# Patient Record
(demographics unavailable — no encounter records)

---

## 2024-11-08 NOTE — HEALTH RISK ASSESSMENT
[Little interest or pleasure doing things] : 1) Little interest or pleasure doing things [Feeling down, depressed, or hopeless] : 2) Feeling down, depressed, or hopeless [0] : 2) Feeling down, depressed, or hopeless: Not at all (0) [PHQ-9 Negative - No further assessment needed] : PHQ-9 Negative - No further assessment needed [Former] : Former [[id number]] : 0

## 2024-11-08 NOTE — HISTORY OF PRESENT ILLNESS
[FreeTextEntry1] : pt here for follow up. [de-identified] : 69 y/o M w/ PMHx of CKD III (Dr. French, Nephro), Afib/flutter on Eliquis s/p DCCV and ablation, myocardial bridge, sinus pauses (refusing PPM), hypothyroidism, osteoporosis (Prolia), GERD (on tums), glaucoma, urge incontinence, chronic bronhcitis and h/o pneumonia a few times (mycoplasma and legionnaires years ago).  11/7 had chills, fever sept 2024, throat pain, couldnt stop coughing, productive cough, tried vicks vaporub, robutussin, vitamin C, took inhaler, gradually coming around, currently afebrile, still has intermittent cough, minimally productive sputum especially at night, side-sleeper, a/w fatigue. Denies dysphagia, loss of appetite. denies wheezing or SOB. denies altered taste in the mouth.   Social history- former smoker, smoked 1 pipe per day for 20 years, stopped in 1993.

## 2024-11-08 NOTE — PHYSICAL EXAM
[Well Nourished] : well nourished [PERRL] : pupils equal round and reactive to light [Normal Oropharynx] : the oropharynx was normal [No Lymphadenopathy] : no lymphadenopathy [Clear to Auscultation] : lungs were clear to auscultation bilaterally [Normal S1, S2] : normal S1 and S2 [No Edema] : there was no peripheral edema [Soft] : abdomen soft [Non Tender] : non-tender [Non-distended] : non-distended [No HSM] : no HSM [No Focal Deficits] : no focal deficits [Alert and Oriented x3] : oriented to person, place, and time [No Accessory Muscle Use] : no accessory muscle use [de-identified] : no sinus tenserness, no enlarged lymph nodes [de-identified] : no egophony, no bronchophony, resonant to percussion

## 2024-11-08 NOTE — REVIEW OF SYSTEMS
[Cough] : cough [Fever] : no fever [Night Sweats] : no night sweats [Vision Problems] : no vision problems [Nasal Discharge] : no nasal discharge [Sore Throat] : no sore throat [Chest Pain] : no chest pain [Shortness Of Breath] : no shortness of breath [Wheezing] : no wheezing [Abdominal Pain] : no abdominal pain [Nausea] : no nausea [Constipation] : no constipation [Diarrhea] : no diarrhea [Vomiting] : no vomiting [Dysuria] : no dysuria [Hematuria] : no hematuria [Joint Pain] : no joint pain [Joint Stiffness] : no joint stiffness [Back Pain] : no back pain [Joint Swelling] : no joint swelling [Itching] : no itching [Headache] : no headache [Dizziness] : no dizziness

## 2024-11-08 NOTE — END OF VISIT
[] : Resident [FreeTextEntry3] : 69 yo male with hx chronic bronchitis, CKD, afib/flutter here for cough x 2 months.    1) cough - pulm cause vs. ?GERD vs. post infectious cough syndrome albuterol inhaler given duration and hx pna, will get CXR, cbc if no improvement and work up unremarkable, recommend see pulm

## 2024-11-08 NOTE — HEALTH RISK ASSESSMENT
[Little interest or pleasure doing things] : 1) Little interest or pleasure doing things [Feeling down, depressed, or hopeless] : 2) Feeling down, depressed, or hopeless [0] : 2) Feeling down, depressed, or hopeless: Not at all (0) [PHQ-9 Negative - No further assessment needed] : PHQ-9 Negative - No further assessment needed [Former] : Former [[id number]] : 0

## 2024-11-08 NOTE — PHYSICAL EXAM
[Well Nourished] : well nourished [PERRL] : pupils equal round and reactive to light [Normal Oropharynx] : the oropharynx was normal [No Lymphadenopathy] : no lymphadenopathy [Clear to Auscultation] : lungs were clear to auscultation bilaterally [Normal S1, S2] : normal S1 and S2 [No Edema] : there was no peripheral edema [Soft] : abdomen soft [Non Tender] : non-tender [Non-distended] : non-distended [No HSM] : no HSM [No Focal Deficits] : no focal deficits [Alert and Oriented x3] : oriented to person, place, and time [No Accessory Muscle Use] : no accessory muscle use [de-identified] : no sinus tenserness, no enlarged lymph nodes [de-identified] : no egophony, no bronchophony, resonant to percussion

## 2024-11-08 NOTE — HISTORY OF PRESENT ILLNESS
[FreeTextEntry1] : pt here for follow up. [de-identified] : 71 y/o M w/ PMHx of CKD III (Dr. French, Nephro), Afib/flutter on Eliquis s/p DCCV and ablation, myocardial bridge, sinus pauses (refusing PPM), hypothyroidism, osteoporosis (Prolia), GERD (on tums), glaucoma, urge incontinence, chronic bronhcitis and h/o pneumonia a few times (mycoplasma and legionnaires years ago).  11/7 had chills, fever sept 2024, throat pain, couldnt stop coughing, productive cough, tried vicks vaporub, robutussin, vitamin C, took inhaler, gradually coming around, currently afebrile, still has intermittent cough, minimally productive sputum especially at night, side-sleeper, a/w fatigue. Denies dysphagia, loss of appetite. denies wheezing or SOB. denies altered taste in the mouth.   Social history- former smoker, smoked 1 pipe per day for 20 years, stopped in 1993.

## 2024-12-06 NOTE — HISTORY OF PRESENT ILLNESS
[FreeTextEntry1] : Pt is here for a follow up. [de-identified] : 69 y/o M w/ PMHx of CKD III (Dr. French, Nephro), Afib/flutter on Eliquis s/p DCCV and ablation, myocardial bridge, sinus pauses (refusing PPM), hypothyroidism, osteoporosis (Prolia), GERD (on tums), glaucoma, urge incontinence, chronic bronhcitis and h/o pneumonia a few times (mycoplasma and legionnaires years ago) presenting w/ 2-3w history of L elbow bursitis. Patient noticed swelling, warmth and mild tenderness of his L elbow which he treated w/ 'cold spray' which initially helped with his symptoms however symptoms returned. Believes that his symptoms are due to continued leaning on his elbow. Denies any trauma, and ROM limitations. Of note patient had history of R elbow bursitis which resolved after aspiration.

## 2024-12-06 NOTE — PLAN
[FreeTextEntry1] : #Olecranon bursitis L elbow. Refractory to icing. - conservative therapy - icing and compression - hand ortho for aspiration  Plan discussed w/ Dr Alicea

## 2024-12-06 NOTE — HEALTH RISK ASSESSMENT
[0] : 2) Feeling down, depressed, or hopeless: Not at all (0) [PHQ-2 Negative - No further assessment needed] : PHQ-2 Negative - No further assessment needed [PHQ-9 Negative - No further assessment needed] : PHQ-9 Negative - No further assessment needed [Never] : Never

## 2024-12-06 NOTE — PHYSICAL EXAM
[No Acute Distress] : no acute distress [Normal Sclera/Conjunctiva] : normal sclera/conjunctiva [PERRL] : pupils equal round and reactive to light [EOMI] : extraocular movements intact [Normal Outer Ear/Nose] : the outer ears and nose were normal in appearance [No JVD] : no jugular venous distention [No Lymphadenopathy] : no lymphadenopathy [Supple] : supple [Thyroid Normal, No Nodules] : the thyroid was normal and there were no nodules present [No Respiratory Distress] : no respiratory distress  [No Accessory Muscle Use] : no accessory muscle use [Clear to Auscultation] : lungs were clear to auscultation bilaterally [Normal Rate] : normal rate  [Regular Rhythm] : with a regular rhythm [Normal S1, S2] : normal S1 and S2 [No Murmur] : no murmur heard [No Edema] : there was no peripheral edema [Soft] : abdomen soft [Non Tender] : non-tender [Non-distended] : non-distended [No Masses] : no abdominal mass palpated [No HSM] : no HSM [Normal Bowel Sounds] : normal bowel sounds [No CVA Tenderness] : no CVA  tenderness [No Spinal Tenderness] : no spinal tenderness [No Joint Swelling] : no joint swelling [No Rash] : no rash [No Focal Deficits] : no focal deficits

## 2024-12-06 NOTE — REVIEW OF SYSTEMS
[Fever] : no fever [Chills] : no chills [Vision Problems] : no vision problems [Chest Pain] : no chest pain [Palpitations] : no palpitations [Claudication] : no  leg claudication [Lower Ext Edema] : no lower extremity edema [Shortness Of Breath] : no shortness of breath [Wheezing] : no wheezing [Cough] : no cough [Dyspnea on Exertion] : not dyspnea on exertion [Constipation] : no constipation [Diarrhea] : no diarrhea [Dysuria] : no dysuria [Joint Pain] : no joint pain [Joint Stiffness] : no joint stiffness [Muscle Pain] : no muscle pain [Muscle Weakness] : no muscle weakness [Joint Swelling] : joint swelling [Skin Rash] : no skin rash [Headache] : no headache [Dizziness] : no dizziness

## 2024-12-06 NOTE — END OF VISIT
[] : Resident [FreeTextEntry3] : 69 yo male with multiple medical problems here for L elbow lump  1) L elbow swelling/lump - likely 2/2 olecranon bursitis refer to ortho, advised compression

## 2025-01-17 NOTE — RESULTS/DATA
[TextEntry] : RADIOLOGY  1/13/25 Findings/ impression: Hyperinflation. Cardiomegaly, thoracic aortic tortuosity. Lungs and mediastinum are unremarkable. Stable bony structures, severe scoliosis. Right rib old fractures.   PFT    EKG / ECHO     MICRO      BACTERIAL:       MYCOBACTERIAL:           FUNGAL:     PATH     OTHER LABS OF NOTE

## 2025-01-22 NOTE — HISTORY OF PRESENT ILLNESS
[TextBox_4] : 69 y/o M w/ PMHx of CKD III (Dr. French, Nephro), Afib/flutter on Eliquis s/p DCCV and ablation, myocardial bridge, sinus pauses (refusing PPM), hypothyroidism, osteoporosis (Prolia), GERD (on tums), glaucoma, urge incontinence, chronic bronhcitis and h/o pneumonia a few times (mycoplasma and legionnaires years ago) [Former] : former

## 2025-01-22 NOTE — HISTORY OF PRESENT ILLNESS
[TextBox_4] : 71 y/o M w/ PMHx of CKD III (Dr. French, Nephro), Afib/flutter on Eliquis s/p DCCV and ablation, myocardial bridge, sinus pauses (refusing PPM), hypothyroidism, osteoporosis (Prolia), GERD (on tums), glaucoma, urge incontinence, chronic bronhcitis and h/o pneumonia a few times (mycoplasma and legionnaires years ago) [Former] : former

## 2025-03-19 NOTE — DATA REVIEWED
[FreeTextEntry1] : Most recent bone mineral density Date: July 1, 2024 Source: Hologic Site: [facility]  Site	BMD	T-score	Change previous	Change baseline	 Lumbar spine	1.024	-0.2		+4.3%#	 Femoral neck	0.488	-3.2		+8.7%#	 Total hip           0.655	-2.4		+9.2%#	 Distal radius	0.613	-1.3		+3.8%	 DXA comments: #Dissimilar scan analysis; left hip values Vertebral fracture assessment without evidence of compression fractures from L1 to L5 (my read)  Impression: I have personally reviewed the DXA images and report, and I compared these images to a DXA dated April 27, 2022 from [facility]. There is osteoporosis by the World Health Organization criteria. While not directly comparable, there were apparent improvements at the lumbar spine, femoral neck, and total hip from previous. Vertebral fracture assessment without evidence of compression fractures.

## 2025-03-19 NOTE — HISTORY OF PRESENT ILLNESS
[FreeTextEntry1] : Mr. Berry is a 71 year-old man with a history of hypertension, hypothyroidism, urinary incontinence, benign prostatic hypertrophy presenting for follow-up of osteoporosis and hypothyroidism. I saw him for an initial visit in January 2018 and last in September 2024.  Bone History Osteoporosis diagnosed in 2012 on bone density (report not available); most recent bone density as below Fracture history: Potential left rib fracture when someone was walking on his back in approximately 2008; vertebral fracture assessment in 2014 showed multiple wedge deformities in the thoracic region (no new fractures on imaging in July 2022) Family history: No parental history of hip fracture Treatment:  Alendronate from 2012 to February 2018, taking correctly and compliant  Teriparatide 20 mcg daily from February 2018 to March 2020 (held from April to June 2018 during evaluation for palpitations)  Denosumab 60 mg SC in April 2020, missed doses during the pandemic; July 2021, January 2022, July 2022, February 2023, August 2023, March 2024, September 2024  Falls: None recent Height loss: About 2 inches Kidney stones: No Dental health: Regular appointments, no upcoming procedures planned Exercise: Walks at least 20 minutes most days, climbs stairs Dairy intake: 1 serving daily (cheese on toast daily) Calcium supplements: 600 mg daily Multivitamin: None Vitamin D supplements: in calcium supplement  Osteoporosis risk factors include: Postmenopausal status,  race, prior fracture, falls, height loss, small thin bones, tobacco use, excessive alcohol, anorexia, family history, vitamin D deficiency, corticosteroid use, seizure medications, malabsorption, hyperparathyroidism, hyperthyroidism. NEGATIVE EXCEPT:  race, prior fracture  Hypothyroidism. Thyroid nodules. He was diagnosed with hypothyroidism and thyroid nodules in 2019.  He has been taking levothyroxine 75 mcg 6 days/week. Thyroid ultrasound in July 2022 with bilateral subcentimeter nodules not meeting criteria for biopsy. He has been clinically and biochemically euthyroid on his regimen of levothyroxine.  No family history of thyroid disease or thyroid cancer.  Interim History  He has received denosumab from July 2021 to present. He has recovered from a chronic nonproductive cough; he was seen by pulmonology.  He has seen multiple providers; notes reviewed.  He has had pan related to osteoarthritis. He has had recurrent trigger finger, with pain improved with a brace. He otherwise feels well.  Medical and surgical history, medications, allergies, social and family history reviewed and updated as needed.

## 2025-03-19 NOTE — PHYSICAL EXAM
[Alert] : alert [Healthy Appearance] : healthy appearance [No Acute Distress] : no acute distress [Normal Sclera/Conjunctiva] : normal sclera/conjunctiva [Normal Hearing] : hearing was normal [No Respiratory Distress] : no respiratory distress [Kyphosis] : kyphosis present [No Stigmata of Cushings Syndrome] : no stigmata of Cushings Syndrome [Acanthosis Nigricans] : no acanthosis nigricans [Normal Insight/Judgement] : insight and judgment were intact [de-identified] : no moon facies, no supraclavicular fat pads [de-identified] : narrow-based gait with cane

## 2025-03-19 NOTE — ASSESSMENT
[FreeTextEntry1] : Osteoporosis. He has a history of multiple morphometric thoracic vertebral fractures. Metabolic evaluation for secondary causes of bone loss previously unremarkable. He had been treated with alendronate since 2012 without significant improvement in bone density. He was treated teriparatide from February 2018 to March 2020 (held from April to June 2018 during evaluation for palpitations), followed by denosumab in April 2020; he missed subsequent therapy during the pandemic and has received denosumab from July 2021 to present. He is tolerating denosumab and we will continue. We discussed that denosumab must be dosed every 6 months due to rebound increase in bone breakdown with abrupt discontinuation of therapy, with transition to bisphosphonate therapy prior to a "drug holiday." Continue denosumab 60 mg SC every 6 months; dose administered today Calcium 2066-5245 mg daily from diet and supplements (to be taken in divided doses as no more than 500-600 mg can be absorbed at one time); continue current regimen Continue current vitamin D regimen pending level with next blood tests Diet, exercise and fall prevention discussed  Hypothyroidism. Thyroid nodules. He was diagnosed with hypothyroidism and thyroid nodules in 2019. He has been biochemically euthyroid on his current regimen of levothyroxine. Thyroid ultrasound in July 2022 with bilateral subcentimeter nodules not meeting criteria for biopsy nor monitoring.  Continue levothyroxine 75 mcg 6 days/week pending level with next blood tests  I reviewed the DXA performed on July 1, 2024 with the patient today.  I reviewed the laboratories performed from July 15, 2024 to present with the patient today. I counseled the patient regarding calcium and vitamin D intake today. I discussed the following osteoporosis therapies: Denosumab

## 2025-03-26 NOTE — DISCUSSION/SUMMARY
[Patient] : the patient [EKG obtained to assist in diagnosis and management of assessed problem(s)] : EKG obtained to assist in diagnosis and management of assessed problem(s) [___ Month(s)] : in [unfilled] month(s) [FreeTextEntry1] : 72 y/o male with h/o htn, hl, hypothyroid, migraine, hcm, afib/flutter s/p failed dccv/rfa, predm, ckd, myocardial bridge, who presents for f/up today   -dc imdur  -CV MRI 8/23 1. The left ventricle (LV) is normal in size. There is asymmetric septal hypertrophy with a maximum measurement of 15 mm. Left ventricular global systolic function is normal. The LV ejection fraction is 54 %. 2. Markedly dilated left atrium. 3. The right ventricle (RV) is normal in size. RV global systolic function is normal. The RV ejection fraction is 56 %. 4. Mild to moderate mitral regurgitation. 5. No significant abnormalities of the visualized portions of the great vessels. 6. On delayed enhancement imaging, there is focal spots of enhancement noted in the basal and mid inferoseptum, which quantifies to approximately 4% of the total myocardium. 7. Differential diagnosis to consider is hypertrophic cardiomyopathy.  -HCM - f/up with EP eval - noted Permanent AF at this time with slow ventricular response and evidence of long pauses. EP highly recommend a pacemaker implant for injury prevention. - in particular leadless micra ppm in detail. - patient not interested at this point EP would not recommend ICD at this point   -close monitoring blood sugar for predm  -labs 2024 reviewed, LpA ordered   -continue lisinopril   -continue eliquis  -f/up renal for ckd  -Echo 2020 CONCLUSIONS:  1. Normal left and right ventricular size and systolic function.  2. Left ventricular ejection fraction is 60-65%.  3. Mild symmetric left ventricular hypertrophy.  4. Aortic sclerosis without significant stenosis.  5. Trace aortic regurgitation.  6. No other significant valvular disease.   7. Biatrial enlargement.  -ambulatory telemetry 12/11 - 12/17/20 significant for Afib/flutter with HR range 29-, prolonged pauses up to 5.9 seconds  -CTA 11/20: Ca score 0, mLAD myocardial bridge  Impression:  1. The calcium score is zero.  2. Myocardial bridge in the mid LAD, 40mm in length and 3.5mm deep, with mild luminal narrowing.  3. Coronary arteries otherwise appear normal.  5. Myocardial crypts in the LV mid septal wall. Prominent trabeculations noted in the left ventricle.  -continue statin  -avoid jaz agents for bridge given pauses on monitor  -ekg ordered today - afib w pvc, rbbb, no st/t changes  -counseled on cvd risk factors  -f/up 6 months for cvd risk factors  I have spent 40 minutes reviewing labs, records, tests and discussed cvd risk factors,

## 2025-03-26 NOTE — HISTORY OF PRESENT ILLNESS
[FreeTextEntry1] : 72 y/o male with h/o htn, hl, hypothyroid, migraine, hcm, ckd, afib/flutter s/p failed dccv/rfa, predm, myocardial bridge, who presents for f/up today    last seen  via telephone   no cp, sob, syncope, lh, palpitations, edema, orthopnea, pnd no further cough/sob  has been on imdur for myocardial bridge  seen by pulm for buchanan  - [place] and PFT normal - CXR done on 25 at [facility] was normal   seen by EP for -HCM - f/up with EP eval - noted Permanent AF at this time with slow ventricular response and evidence of long pauses. EP highly recommend a pacemaker implant for injury prevention. - in particular leadless micra ppm in detail. - patient not interested at this point EP would not recommend ICD at this point  -CV MRI  1. The left ventricle (LV) is normal in size. There is asymmetric septal hypertrophy with a maximum measurement of 15 mm. Left ventricular global systolic function is normal. The LV ejection fraction is 54 %. 2. Markedly dilated left atrium. 3. The right ventricle (RV) is normal in size. RV global systolic function is normal. The RV ejection fraction is 56 %. 4. Mild to moderate mitral regurgitation. 5. No significant abnormalities of the visualized portions of the great vessels. 6. On delayed enhancement imaging, there is focal spots of enhancement noted in the basal and mid inferoseptum, which quantifies to approximately 4% of the total myocardium. 7. Differential diagnosis to consider is hypertrophic cardiomyopathy   event monitor  w slow afib/pauses - refused ppm at that time  seen by EP   ambulatory telemetry  - 20 significant for Afib/flutter with HR range 29-, prolonged pauses up to 5.9 seconds  CTA : Ca score 0, mLAD myocardial bridge  Impression: 1. The calcium score is zero. 2. Myocardial bridge in the mid LAD, 40mm in length and 3.5mm deep, with mild luminal narrowing. 3. Coronary arteries otherwise appear normal. 5. Myocardial crypts in the LV mid septal wall. Prominent trabeculations noted in the left ventricle. Please see separate radiology report for non-coronary findings.    -------------------------------------  GINNA 2019: mild ai, mild pr, trace mr  ambulatory telemetry 9/15 - 19 significant for NSR (50-), PVC burden 2%, no AT/AF.  GINNA/DCCV to SR on 10/11/18. He reports he felt like "the energizer bunny" for one week post cardioversion. He had recurrent fatigue and BUCHANAN and was found to be back in atrial fibrillation. He was loaded with Amiodarone and is s/p successful cardioversion to SR on 18. He noted a significant improvement in his energy level and BUCHANAN post procedure. Maintained on Amiodarone 100 mg daily thereafter. He presented for elective ablation procedure 3/2019 and was complaining of chest pain. Troponins were negative x2, Echocardiogram was normal. CT with numerous tiny lucent lesions throughout the bones. MRI of thoracic spine significant for 8mm lesion in T10. Ultimately, it was felt this was an over-read and the risk of biopsy outweighed the benefits. He is s/p successful PVI and RFA of the CTI on 19. Post procedure, Amiodarone and Verapamil were stopped due to sinus bradycardia. History also notable for hypothyroidism.  He was admitted to [facility] 2020 with HTN urgency and persistent headaches. Work-up significant for CTA findings of myocardial bridge. Ruled out for ACS. He was noted to be in atrial fibrillation with slow ventricular response and 2-3 sec pauses. He had an episode of presyncope after hospital discharge and was advised by Dr. Snider to reduce Metoprolol from 25 to 12.5 mg BID. No recurrent presyncope    PMH/PSH:  htn  hl  afib/flutter s/p dccv/rfa   mid LAD myocardial bridge  migraine  hearing loss  glaucoma  hypothyroid  osteoporosis  retinal hemorrhage  scoliosis  ckd  gerd  cataract  foot surgery  inguinal hernia repair  predm  hcm    ALL:  nkda    MEDS:  lipitor 10 mg qhs  calcium/d  eliquis 5 mg bid  finasteride 5 mg qhs  imdur 30 mg qd  levothyroxine 75 mcg qd  lisinopril 5 mg qd  myrbetriq  prolia  dexilant     SH:  no tobacco  former cigar  social etoh  no drugs  musician/  retired    no children  lives alone    FH:  mother -  uterine cancer 82  father - parkinson's  78  brother - alzheimer's  sister/brother - younger, alive, healthy

## 2025-05-08 NOTE — ASSESSMENT
[FreeTextEntry1] : Diagnosis: BPH/LUTS  Plan: PVR today was 0 No episode of hematuria Remain on finasteride Myrbetriq 50 mg daily Due for PSA, will complete when he sees his PCP end of May

## 2025-05-08 NOTE — HISTORY OF PRESENT ILLNESS
[FreeTextEntry1] : PCP: Dr. Subramanian Im [address] [place] [address]  Dr. Ibrahima French Nephrologist  CC: f/u on urgency and frequency of urination  HPI:  Doing well today On myrbetriq daily and finasteride Medication is working well, occasional urgency Rare nocturia   PVR today was 0 cc  Originally referred from Dr. French nephrologist for symptoms of urgency. Patient states has been told he has an enlarged prostate. Has tried tamsulosin in the past but did not like it.  As per last visit states Urgency- daily resulting in small amount of incontinence, frequency every hour, occasional night time incontinence. Denies dysuria and gross hematuria.   PSA 10/2018: 2.25, 3/31/22 3.44; 2/2023 3.67 ng/mL   Cr 1.36 mg/dL 9/2024   FAMHX: mother- uterine cancer SURGHX: muscle transplant right ankle, leg surgeries, left inguinal hernia repair 1993, cardiac ablation 2018- afib, trigger finger surgery SOCIAL: occasional cigar, lives alone, denies alcohol, retired musician- guitar

## 2025-05-08 NOTE — PHYSICAL EXAM
[General Appearance - Well Developed] : well developed [General Appearance - Well Nourished] : well nourished [Normal Appearance] : normal appearance [Well Groomed] : well groomed [General Appearance - In No Acute Distress] : no acute distress [Abdomen Soft] : soft [No Focal Deficits] : no focal deficits

## 2025-05-29 NOTE — END OF VISIT
[FreeTextEntry3] : 70 yo male with hx CKD, HTN, afib/flutter on eliquis sp ablation, sinus pauses, GERD here for CPE  1) HCM cscope 2023 normal follows with endo for osteoporosis pnuemo vaccines UTD

## 2025-05-29 NOTE — HEALTH RISK ASSESSMENT
[Audit-CScore] : 3 [[facility]] : 12 [[id number]] : 0 [Change in mental status noted] : No change in mental status noted [Language] : denies difficulty with language [Behavior] : denies difficulty with behavior [Learning/Retaining New Information] : denies difficulty learning/retaining new information [Handling Complex Tasks] : denies difficulty handling complex tasks [Reasoning] : denies difficulty with reasoning [Spatial Ability and Orientation] : denies difficulty with spatial ability and orientation [Sexually Active] : not sexually active [Reports changes in hearing] : Reports no changes in hearing [Reports changes in vision] : Reports no changes in vision [BoneDensityDate] : 07/24 [BoneDensityComments] : Osteoporosis; due in 2026 [ColonoscopyDate] : 07/23

## 2025-05-29 NOTE — PLAN
[FreeTextEntry1] : #Permanent afib I48.91 Patient opting for no further rhythm control after DCCV/ablation in the past. Rate controlled Afib on today's ECG w/o evidence of pause however not currently on any rate control strategies. CHADs-VASc at least 2 however there is c/f underlying HCM per cards notes although latest TTE does not reveal increased intracardiac gradients or GLENNY physiology. As such patient should be continued on AC regardless of CHADs-VASc at this time.   Plan:  - C/w eliquis 5mg PO BID - F/u TSH and CBC   #Chest pain R07.9 #Scoliosis M41.9 Patient reports continued intermittent L chest retrosternal pressure that does not appear anginal in nature w/ AACS and GI etiologies r/o so far; ECG today unremarkable for acute ischemic changes or findings consistent w/ pericarditis. Pain could still be MSK related and patient is adamant on exhausting all work-up alternatives. Given continued back pain and lack of interest in surgical options for known scoliosis patient would benefit from PMR referral for workup/management of the above issues.  Plan: - PMR referral   #CKDIII N18.30 Stable eGFR by Cystatin C since 2021. On ACE-i for nephroprotective effect  Plan: - F/u repeat CMP, cystatin C and CBC  #Hypothyroidism E03.9 On home levothryroxine 75mcg PO daily. Latest TSH wnl.  Plan: - C/w levothyroxine 75mcg PO daily - F/u repeat TSH  #BPH w/ nocturia N40.1 Patient on finasteride 5mg PO daily and myrbetriq 50mg PO daily however has continued urinary incontinence symptoms.   Plan: - C/w meds as above - F/u PSA levels   #HCM Immunizations: due for shingles vaccine; advised to obtain from pharmacy   C/w Dr. Madina Shanks

## 2025-05-29 NOTE — PHYSICAL EXAM
[de-identified] : significant dextroscoliosis w/ R leg shorter than the left  [TextBox_2] : Yes [TextBox_4] : Undergraduate [SlumsTotal] : 24

## 2025-05-29 NOTE — HEALTH RISK ASSESSMENT
[Audit-CScore] : 3 [[facility]] : 12 [[id number]] : 0 [Change in mental status noted] : No change in mental status noted [Language] : denies difficulty with language [Behavior] : denies difficulty with behavior [Learning/Retaining New Information] : denies difficulty learning/retaining new information [Handling Complex Tasks] : denies difficulty handling complex tasks [Reasoning] : denies difficulty with reasoning [Spatial Ability and Orientation] : denies difficulty with spatial ability and orientation [Sexually Active] : not sexually active [Reports changes in hearing] : Reports no changes in hearing [Reports changes in vision] : Reports no changes in vision [BoneDensityDate] : 07/24 [BoneDensityComments] : Osteoporosis; due in 2026 [ColonoscopyDate] : 07/23

## 2025-05-29 NOTE — HISTORY OF PRESENT ILLNESS
[de-identified] : 70 y/o M w/ PMHx of CKD III (Dr. French, Nephro), HTN, Afib/flutter on Eliquis s/p DCCV and ablation, myocardial bridge, sinus pauses (refusing PPM), hypothyroidism, osteoporosis (Prolia), scoliosis, GERD (on tums), glaucoma, urge incontinence, chronic bronchitis and h/o pneumonia a few times (mycoplasma and legionnaires years ago) that presents to clinic for annual wellness visit. Since last seen in clinic he does not report any hospitalizations or acute illness. He reports of continued dull left chest pain that resolves after antiacid consumption for which EGD/barium swallow which did not find any abnormalities; last saw cardiologist Dr. Dan on March '25 w/ ECG unremarkable at that time for ischemia. In addition, he complains of chronic back pain 2/2 scoliosis for which he would like to explore possible physical rehabilitation alternatives. He reports intermittent incontinence while on max dose of mirabegron but no worsening so far.

## 2025-05-29 NOTE — HISTORY OF PRESENT ILLNESS
[de-identified] : 72 y/o M w/ PMHx of CKD III (Dr. French, Nephro), HTN, Afib/flutter on Eliquis s/p DCCV and ablation, myocardial bridge, sinus pauses (refusing PPM), hypothyroidism, osteoporosis (Prolia), scoliosis, GERD (on tums), glaucoma, urge incontinence, chronic bronchitis and h/o pneumonia a few times (mycoplasma and legionnaires years ago) that presents to clinic for annual wellness visit. Since last seen in clinic he does not report any hospitalizations or acute illness. He reports of continued dull left chest pain that resolves after antiacid consumption for which EGD/barium swallow which did not find any abnormalities; last saw cardiologist Dr. Dan on March '25 w/ ECG unremarkable at that time for ischemia. In addition, he complains of chronic back pain 2/2 scoliosis for which he would like to explore possible physical rehabilitation alternatives. He reports intermittent incontinence while on max dose of mirabegron but no worsening so far.

## 2025-05-29 NOTE — ASSESSMENT
[FreeTextEntry1] : 70 y/o M w/ PMHx of CKD III (Dr. French, Nephro), HTN, Afib/flutter on Eliquis s/p DCCV and ablation, myocardial bridge, sinus pauses (refusing PPM), hypothyroidism, osteoporosis (Prolia), scoliosis, GERD (on tums), glaucoma, urge incontinence, chronic bronchitis and h/o pneumonia a few times (mycoplasma and legionnaires years ago) that presents to clinic for annual wellness visit. RTC in 6 months.

## 2025-05-29 NOTE — PHYSICAL EXAM
[de-identified] : significant dextroscoliosis w/ R leg shorter than the left  [TextBox_2] : Yes [TextBox_4] : Undergraduate [SlumsTotal] : 24

## 2025-05-29 NOTE — END OF VISIT
[FreeTextEntry3] : 72 yo male with hx CKD, HTN, afib/flutter on eliquis sp ablation, sinus pauses, GERD here for CPE  1) HCM cscope 2023 normal follows with endo for osteoporosis pnuemo vaccines UTD